# Patient Record
Sex: MALE | Race: OTHER | URBAN - METROPOLITAN AREA
[De-identification: names, ages, dates, MRNs, and addresses within clinical notes are randomized per-mention and may not be internally consistent; named-entity substitution may affect disease eponyms.]

---

## 2022-06-01 ENCOUNTER — OFFICE VISIT (OUTPATIENT)
Dept: PODIATRY | Facility: CLINIC | Age: 42
End: 2022-06-01
Payer: COMMERCIAL

## 2022-06-01 ENCOUNTER — OFFICE VISIT (OUTPATIENT)
Dept: URGENT CARE | Facility: CLINIC | Age: 42
End: 2022-06-01
Payer: COMMERCIAL

## 2022-06-01 VITALS
DIASTOLIC BLOOD PRESSURE: 82 MMHG | TEMPERATURE: 97 F | BODY MASS INDEX: 30.48 KG/M2 | SYSTOLIC BLOOD PRESSURE: 121 MMHG | HEIGHT: 72 IN | WEIGHT: 225 LBS | RESPIRATION RATE: 16 BRPM | OXYGEN SATURATION: 97 % | HEART RATE: 73 BPM

## 2022-06-01 VITALS — BODY MASS INDEX: 30.48 KG/M2 | WEIGHT: 225.06 LBS | HEIGHT: 72 IN

## 2022-06-01 DIAGNOSIS — L03.032 PARONYCHIA OF GREAT TOE OF LEFT FOOT: Primary | ICD-10-CM

## 2022-06-01 DIAGNOSIS — L60.0 INGROWN NAIL: Primary | ICD-10-CM

## 2022-06-01 DIAGNOSIS — B35.1 ONYCHOMYCOSIS: ICD-10-CM

## 2022-06-01 PROCEDURE — 1159F MED LIST DOCD IN RCRD: CPT | Mod: CPTII,S$GLB,, | Performed by: PHYSICIAN ASSISTANT

## 2022-06-01 PROCEDURE — 1160F RVW MEDS BY RX/DR IN RCRD: CPT | Mod: CPTII,S$GLB,, | Performed by: PODIATRIST

## 2022-06-01 PROCEDURE — 3074F SYST BP LT 130 MM HG: CPT | Mod: CPTII,S$GLB,, | Performed by: PHYSICIAN ASSISTANT

## 2022-06-01 PROCEDURE — 99999 PR PBB SHADOW E&M-EST. PATIENT-LVL III: CPT | Mod: PBBFAC,,, | Performed by: PODIATRIST

## 2022-06-01 PROCEDURE — 3074F PR MOST RECENT SYSTOLIC BLOOD PRESSURE < 130 MM HG: ICD-10-PCS | Mod: CPTII,S$GLB,, | Performed by: PHYSICIAN ASSISTANT

## 2022-06-01 PROCEDURE — 99999 PR PBB SHADOW E&M-EST. PATIENT-LVL III: ICD-10-PCS | Mod: PBBFAC,,, | Performed by: PODIATRIST

## 2022-06-01 PROCEDURE — 99203 OFFICE O/P NEW LOW 30 MIN: CPT | Mod: 25,S$GLB,, | Performed by: PODIATRIST

## 2022-06-01 PROCEDURE — 1160F PR REVIEW ALL MEDS BY PRESCRIBER/CLIN PHARMACIST DOCUMENTED: ICD-10-PCS | Mod: CPTII,S$GLB,, | Performed by: PHYSICIAN ASSISTANT

## 2022-06-01 PROCEDURE — 1160F RVW MEDS BY RX/DR IN RCRD: CPT | Mod: CPTII,S$GLB,, | Performed by: PHYSICIAN ASSISTANT

## 2022-06-01 PROCEDURE — 99203 PR OFFICE/OUTPT VISIT, NEW, LEVL III, 30-44 MIN: ICD-10-PCS | Mod: 25,S$GLB,, | Performed by: PODIATRIST

## 2022-06-01 PROCEDURE — 1159F MED LIST DOCD IN RCRD: CPT | Mod: CPTII,S$GLB,, | Performed by: PODIATRIST

## 2022-06-01 PROCEDURE — 99203 OFFICE O/P NEW LOW 30 MIN: CPT | Mod: S$GLB,,, | Performed by: PHYSICIAN ASSISTANT

## 2022-06-01 PROCEDURE — 3008F PR BODY MASS INDEX (BMI) DOCUMENTED: ICD-10-PCS | Mod: CPTII,S$GLB,, | Performed by: PODIATRIST

## 2022-06-01 PROCEDURE — 99203 PR OFFICE/OUTPT VISIT, NEW, LEVL III, 30-44 MIN: ICD-10-PCS | Mod: S$GLB,,, | Performed by: PHYSICIAN ASSISTANT

## 2022-06-01 PROCEDURE — 3079F PR MOST RECENT DIASTOLIC BLOOD PRESSURE 80-89 MM HG: ICD-10-PCS | Mod: CPTII,S$GLB,, | Performed by: PHYSICIAN ASSISTANT

## 2022-06-01 PROCEDURE — 3008F PR BODY MASS INDEX (BMI) DOCUMENTED: ICD-10-PCS | Mod: CPTII,S$GLB,, | Performed by: PHYSICIAN ASSISTANT

## 2022-06-01 PROCEDURE — 4010F PR ACE/ARB THEARPY RXD/TAKEN: ICD-10-PCS | Mod: CPTII,S$GLB,, | Performed by: PHYSICIAN ASSISTANT

## 2022-06-01 PROCEDURE — 11730 NAIL REMOVAL: ICD-10-PCS | Mod: S$GLB,,, | Performed by: PODIATRIST

## 2022-06-01 PROCEDURE — 3079F DIAST BP 80-89 MM HG: CPT | Mod: CPTII,S$GLB,, | Performed by: PHYSICIAN ASSISTANT

## 2022-06-01 PROCEDURE — 4010F ACE/ARB THERAPY RXD/TAKEN: CPT | Mod: CPTII,S$GLB,, | Performed by: PHYSICIAN ASSISTANT

## 2022-06-01 PROCEDURE — 1159F PR MEDICATION LIST DOCUMENTED IN MEDICAL RECORD: ICD-10-PCS | Mod: CPTII,S$GLB,, | Performed by: PHYSICIAN ASSISTANT

## 2022-06-01 PROCEDURE — 1160F PR REVIEW ALL MEDS BY PRESCRIBER/CLIN PHARMACIST DOCUMENTED: ICD-10-PCS | Mod: CPTII,S$GLB,, | Performed by: PODIATRIST

## 2022-06-01 PROCEDURE — 3008F BODY MASS INDEX DOCD: CPT | Mod: CPTII,S$GLB,, | Performed by: PODIATRIST

## 2022-06-01 PROCEDURE — 4010F ACE/ARB THERAPY RXD/TAKEN: CPT | Mod: CPTII,S$GLB,, | Performed by: PODIATRIST

## 2022-06-01 PROCEDURE — 4010F PR ACE/ARB THEARPY RXD/TAKEN: ICD-10-PCS | Mod: CPTII,S$GLB,, | Performed by: PODIATRIST

## 2022-06-01 PROCEDURE — 3008F BODY MASS INDEX DOCD: CPT | Mod: CPTII,S$GLB,, | Performed by: PHYSICIAN ASSISTANT

## 2022-06-01 PROCEDURE — 1159F PR MEDICATION LIST DOCUMENTED IN MEDICAL RECORD: ICD-10-PCS | Mod: CPTII,S$GLB,, | Performed by: PODIATRIST

## 2022-06-01 PROCEDURE — 11730 AVULSION NAIL PLATE SIMPLE 1: CPT | Mod: S$GLB,,, | Performed by: PODIATRIST

## 2022-06-01 RX ORDER — TRIAMCINOLONE ACETONIDE 5 MG/G
CREAM TOPICAL 2 TIMES DAILY
Qty: 15 G | Refills: 0 | Status: SHIPPED | OUTPATIENT
Start: 2022-06-01

## 2022-06-01 RX ORDER — LISINOPRIL 5 MG/1
TABLET ORAL
COMMUNITY

## 2022-06-01 RX ORDER — ECONAZOLE NITRATE 10 MG/G
CREAM TOPICAL 2 TIMES DAILY
Qty: 30 G | Refills: 0 | Status: SHIPPED | OUTPATIENT
Start: 2022-06-01

## 2022-06-01 NOTE — LETTER
June 1, 2022      [facility]  [address], [address]  [place] [address]  Phone: [phone]       Patient: Kwaku Wilkerson   YOB: 1980  Date of Visit: 06/01/2022    To Whom It May Concern:    Kwaku Wilkerson  was at [facility] on 06/01/2022. The patient may return to work/school on 6/1/2022 with no restrictions. If you have any questions or concerns, or if I can be of further assistance, please do not hesitate to contact me.    Sincerely,    Nancy Reyna PA-C

## 2022-06-01 NOTE — PROGRESS NOTES
Subjective:       Patient ID: Kwaku Wilkerson is a 42 y.o. male.    Vitals:  height is 6' (1.829 m) and weight is 102.1 kg (225 lb). His tympanic temperature is 97.2 °F (36.2 °C). His blood pressure is 121/82 and his pulse is 73. His respiration is 16 and oxygen saturation is 97%.     Chief Complaint: Toe Pain    Pt presents to clinic today for evaluation of an ingrown toenail that has been present for almost a year. Pt states painful to touch, getting worse. No warmth, drainage, or loss of sensation.     Toe Pain   The incident occurred more than 1 week ago. There was no injury mechanism. The pain is present in the left toes. The pain is at a severity of 6/10. The pain is moderate. The pain has been constant since onset. Pertinent negatives include no inability to bear weight, loss of motion, loss of sensation or muscle weakness. He reports no foreign bodies present. The symptoms are aggravated by weight bearing. He has tried nothing for the symptoms. The treatment provided no relief.       Constitution: Negative.   Musculoskeletal: Positive for pain (Ingrown nail pain, left foot). Negative for trauma, joint swelling and abnormal ROM of joint.   Neurological: Negative.        Objective:      Physical Exam   Constitutional: He is oriented to person, place, and time. He appears well-developed. He is cooperative.  Non-toxic appearance. He does not appear ill. No distress.   HENT:   Head: Normocephalic and atraumatic.   Ears:   Right Ear: Hearing normal.   Left Ear: Hearing normal.   Nose: No mucosal edema or nasal deformity. No epistaxis. Right sinus exhibits no maxillary sinus tenderness and no frontal sinus tenderness. Left sinus exhibits no maxillary sinus tenderness and no frontal sinus tenderness.   Mouth/Throat: Uvula is midline, oropharynx is clear and moist and mucous membranes are normal. No trismus in the jaw. Normal dentition. No uvula swelling.   Eyes: Conjunctivae and lids are normal. Right eye exhibits no  discharge. Left eye exhibits no discharge. No scleral icterus.   Neck: Trachea normal and phonation normal. Neck supple.   Cardiovascular: Normal rate, regular rhythm and normal heart sounds.   Pulmonary/Chest: Effort normal and breath sounds normal. No respiratory distress.   Abdominal: Normal appearance.   Musculoskeletal: Normal range of motion.         General: No deformity. Normal range of motion.      Right foot: Normal range of motion. No deformity. No bruising or atrophy present. Plantar foot sensation: normal.      Left foot: Normal range of motion. No deformity. Left great toe: Exhibits tenderness (ingrown nail). No bruising or atrophy present. Plantar foot sensation: normal.   Neurological: He is alert and oriented to person, place, and time. He exhibits normal muscle tone. Coordination normal.   Skin: Skin is warm, dry, intact, not diaphoretic and not pale.   Psychiatric: His speech is normal and behavior is normal. Judgment and thought content normal.   Nursing note and vitals reviewed.        Assessment:       1. Ingrown nail    2. Onychomycosis          Plan:       Patient with chronic ingrown toenail of left great toe. Provided patient with literature regarding ingrown nails and discussed conservative management. Plan as follows:   Ingrown nail  -     triamcinolone acetonide 0.5% (KENALOG) 0.5 % Crea; Apply topically 2 (two) times daily.  Dispense: 15 g; Refill: 0  -     econazole nitrate 1 % cream; Apply topically 2 (two) times daily.  Dispense: 30 g; Refill: 0  -     Ambulatory referral/consult to Podiatry    Onychomycosis  -     econazole nitrate 1 % cream; Apply topically 2 (two) times daily.  Dispense: 30 g; Refill: 0           Nancy Reyna, [facility], PA-C  [facility]

## 2022-06-01 NOTE — PROCEDURES
Nail Removal    Date/Time: 6/1/2022 1:30 PM  Performed by: Reena Betancourt DPM  Authorized by: Reena Betancourt DPM     Consent Done?:  Yes (Written)  Location:     Location:  Left foot    Location detail:  Left big toe  Anesthesia:     Anesthesia:  Digital block    Local anesthetic:  Lidocaine 1% without epinephrine    Anesthetic total (ml):  3  Procedure Details:     Preparation:  Skin prepped with alcohol and skin prepped with Betadine    Amount removed:  Partial    Side:  Bilateral    Wedge excision of skin of nail fold: No      Nail bed sutured?: No      Nail matrix removed:  None    Removed nail replaced and anchored: No      Dressing applied:  4x4, antibiotic ointment and dressing applied    Patient tolerance:  Patient tolerated the procedure well with no immediate complications

## 2022-06-01 NOTE — PROGRESS NOTES
Subjective:       Patient ID: Kwaku Wilkerson is a 42 y.o. male.    Chief Complaint: Ingrown Toenail (C/O ingrown in left hallux toe, rates pain 7/10, nondiabetic, wearing socks and tennis hoes, No PCP.)      HPI: Kwaku Wilkerson presents to the office with complaints of pains to the left great  toe, due to ingrowing. States no drainage. States swelling, redness and moderate to severe pains. Symptoms have been on going for several days and are worsening. States difficulties with walking as a result of pains. Walking and standing, particularly with shoe gear, exacerbates the ailment. Pains are sharp in nature and are rated at approx. 7/10.  Is currently here traveling for work.  Does reside in [place].  Patient's Primary Care Provider is [facility]     Review of patient's allergies indicates:  No Known Allergies    Past Medical History:   Diagnosis Date    Hypertension        History reviewed. No pertinent family history.    Social History     Socioeconomic History    Marital status: Single   Tobacco Use    Smoking status: Former Smoker    Smokeless tobacco: Never Used       Past Surgical History:   Procedure Laterality Date    CARDIAC DEFIBRILLATOR PLACEMENT      FRACTURE SURGERY         Review of Systems      Objective:   Ht 6' (1.829 m)   Wt 102.1 kg (225 lb 1.4 oz)   BMI 30.53 kg/m²     Physical Exam  LOWER EXTREMITY PHYSICAL EXAMINATION    NEUROLOGY: Sensation to light touch is intact. Proprioception is intact.     VASCULAR: On the left foot, the dorsalis pedis pulse is 2/4 and the posterior tibial pulse is 2/4. Capillary refill time is less than 3 seconds. Hair growth is present on the dorsum of the foot and at the digits. Proximal to distal temperature is warm to warm.    DERMATOLOGY: Ingrowing of the left foot medial and lateral nail border of the great toe. The nail is incurvated into the skin of the affected border, causing pains, which are moderate in nature. There is mild edema. There is no  cellulitis noted. There is no drainage. No fluctuance. Granuloma formation is absent.    ORTHOPEDIC: Manual Muscle Testing is 5/5 in all planes on the left, without pains, with and without resistance. Gait pattern is slightly antalgic.    Assessment:     1. Paronychia of great toe of left foot        Plan:     Paronychia of great toe of left foot  -     Nail Removal        We discussed patient's options for treating the ingrown toenail.  We discussed slant back procedure to remove the distal offending edge, we discussed temporary partial avulsion, temporary complete avulsion, and attempted permanent matricectomy.  Patient would like to proceed with avulsion of the left hallux nail on the medial lateral border.  Discussed the risk and benefits of the procedure.  Discussed risk of recurrence.  Patient did give written consent to proceed with procedure.    Patient tolerated procedure well without complications.  Large spicule was removed without complications.  History patient will start soaking in warm water and Epson salt twice daily.  Apply antibiotic cream and a Band-Aid to the affected borders following soaking and showering.  Patient will call if there is any acute signs of infection associated with increased redness, swelling, abnormal drainage, increased pain.        No future appointments.

## 2022-06-04 ENCOUNTER — TELEPHONE (OUTPATIENT)
Dept: URGENT CARE | Facility: CLINIC | Age: 42
End: 2022-06-04
Payer: COMMERCIAL

## 2022-06-04 NOTE — TELEPHONE ENCOUNTER
I called to check on Mr. Wilkerson and he said his toe is doing better.  He stated it was still a little painful and still had a little pus but it was better.  I told him to let us know if it got any worse.